# Patient Record
Sex: MALE | Race: WHITE | Employment: FULL TIME | URBAN - METROPOLITAN AREA
[De-identification: names, ages, dates, MRNs, and addresses within clinical notes are randomized per-mention and may not be internally consistent; named-entity substitution may affect disease eponyms.]

---

## 2019-02-22 LAB
ALBUMIN SERPL-MCNC: NORMAL G/DL
ALP BLD-CCNC: NORMAL U/L
ALT SERPL-CCNC: NORMAL U/L
ANION GAP SERPL CALCULATED.3IONS-SCNC: NORMAL MMOL/L
AST SERPL-CCNC: NORMAL U/L
BASOPHILS ABSOLUTE: NORMAL
BASOPHILS RELATIVE PERCENT: NORMAL
BILIRUB SERPL-MCNC: NORMAL MG/DL
BUN BLDV-MCNC: NORMAL MG/DL
CALCIUM SERPL-MCNC: NORMAL MG/DL
CHLORIDE BLD-SCNC: NORMAL MMOL/L
CHOLESTEROL, TOTAL: NORMAL
CHOLESTEROL/HDL RATIO: NORMAL
CO2: NORMAL
CREAT SERPL-MCNC: NORMAL MG/DL
EOSINOPHILS ABSOLUTE: NORMAL
EOSINOPHILS RELATIVE PERCENT: NORMAL
GFR CALCULATED: NORMAL
GLUCOSE BLD-MCNC: NORMAL MG/DL
HCT VFR BLD CALC: NORMAL %
HDLC SERPL-MCNC: NORMAL MG/DL
HEMOGLOBIN: NORMAL
LDL CHOLESTEROL CALCULATED: NORMAL
LYMPHOCYTES ABSOLUTE: NORMAL
LYMPHOCYTES RELATIVE PERCENT: NORMAL
MCH RBC QN AUTO: NORMAL PG
MCHC RBC AUTO-ENTMCNC: NORMAL G/DL
MCV RBC AUTO: NORMAL FL
MONOCYTES ABSOLUTE: NORMAL
MONOCYTES RELATIVE PERCENT: NORMAL
NEUTROPHILS ABSOLUTE: NORMAL
NEUTROPHILS RELATIVE PERCENT: NORMAL
NONHDLC SERPL-MCNC: NORMAL MG/DL
PLATELET # BLD: NORMAL 10*3/UL
PMV BLD AUTO: NORMAL FL
POTASSIUM SERPL-SCNC: NORMAL MMOL/L
RBC # BLD: NORMAL 10*6/UL
SODIUM BLD-SCNC: NORMAL MMOL/L
TOTAL PROTEIN: NORMAL
TRIGL SERPL-MCNC: NORMAL MG/DL
VLDLC SERPL CALC-MCNC: NORMAL MG/DL
WBC # BLD: NORMAL 10*3/UL

## 2019-10-07 ENCOUNTER — OFFICE VISIT (OUTPATIENT)
Dept: PRIMARY CARE CLINIC | Age: 24
End: 2019-10-07

## 2019-10-07 VITALS
BODY MASS INDEX: 28.47 KG/M2 | SYSTOLIC BLOOD PRESSURE: 127 MMHG | WEIGHT: 181.4 LBS | HEART RATE: 55 BPM | HEIGHT: 67 IN | DIASTOLIC BLOOD PRESSURE: 85 MMHG | RESPIRATION RATE: 14 BRPM | TEMPERATURE: 97.7 F | OXYGEN SATURATION: 99 %

## 2019-10-07 DIAGNOSIS — J01.40 ACUTE NON-RECURRENT PANSINUSITIS: Primary | ICD-10-CM

## 2019-10-07 PROCEDURE — 99213 OFFICE O/P EST LOW 20 MIN: CPT | Performed by: NURSE PRACTITIONER

## 2019-10-07 RX ORDER — DOXYCYCLINE HYCLATE 100 MG
100 TABLET ORAL 2 TIMES DAILY
Qty: 20 TABLET | Refills: 0 | Status: SHIPPED | OUTPATIENT
Start: 2019-10-07 | End: 2019-10-17

## 2019-10-07 RX ORDER — EPINEPHRINE 0.3 MG/.3ML
INJECTION SUBCUTANEOUS
COMMUNITY
Start: 2019-08-12 | End: 2021-01-11 | Stop reason: SDUPTHER

## 2019-10-07 RX ORDER — LEVOCETIRIZINE DIHYDROCHLORIDE 5 MG/1
5 TABLET, FILM COATED ORAL NIGHTLY
COMMUNITY

## 2019-10-07 RX ORDER — DULOXETIN HYDROCHLORIDE 30 MG/1
CAPSULE, DELAYED RELEASE ORAL
COMMUNITY
Start: 2019-09-15 | End: 2020-12-03

## 2020-07-31 ENCOUNTER — TELEPHONE (OUTPATIENT)
Dept: PRIMARY CARE CLINIC | Age: 25
End: 2020-07-31

## 2020-07-31 NOTE — TELEPHONE ENCOUNTER
Pt would like a name or a referral  for a psychiatrist ,the patient states his meds has not been working the last couple weeks.

## 2020-07-31 NOTE — TELEPHONE ENCOUNTER
Patient can try [facility] on [address] here in [facility]. Phone #[phone].

## 2020-08-18 RX ORDER — PANTOPRAZOLE SODIUM 40 MG/1
40 TABLET, DELAYED RELEASE ORAL DAILY
Qty: 90 TABLET | Refills: 3 | Status: SHIPPED | OUTPATIENT
Start: 2020-08-18

## 2020-09-24 VITALS
BODY MASS INDEX: 27.28 KG/M2 | HEIGHT: 68 IN | DIASTOLIC BLOOD PRESSURE: 74 MMHG | WEIGHT: 180 LBS | SYSTOLIC BLOOD PRESSURE: 125 MMHG

## 2020-12-03 ENCOUNTER — VIRTUAL VISIT (OUTPATIENT)
Dept: PRIMARY CARE CLINIC | Age: 25
End: 2020-12-03
Payer: COMMERCIAL

## 2020-12-03 PROCEDURE — 99422 OL DIG E/M SVC 11-20 MIN: CPT | Performed by: INTERNAL MEDICINE

## 2020-12-03 RX ORDER — FLUOXETINE HYDROCHLORIDE 20 MG/1
20 CAPSULE ORAL DAILY
Qty: 30 CAPSULE | Refills: 5 | Status: SHIPPED
Start: 2020-12-03 | End: 2021-06-08 | Stop reason: SDUPTHER

## 2020-12-03 ASSESSMENT — PATIENT HEALTH QUESTIONNAIRE - PHQ9
SUM OF ALL RESPONSES TO PHQ QUESTIONS 1-9: 0
SUM OF ALL RESPONSES TO PHQ QUESTIONS 1-9: 0
1. LITTLE INTEREST OR PLEASURE IN DOING THINGS: 0
2. FEELING DOWN, DEPRESSED OR HOPELESS: 0
SUM OF ALL RESPONSES TO PHQ QUESTIONS 1-9: 0
SUM OF ALL RESPONSES TO PHQ9 QUESTIONS 1 & 2: 0

## 2020-12-03 NOTE — PROGRESS NOTES
Papa Corrales  12/3/20     Chief Complaint   Patient presents with    Anxiety      tdap questions         Allergies   Allergen Reactions    Amoxicillin     Azithromycin Diarrhea    Pcn [Penicillins]     Prochlorperazine Edisylate     Succinylcholine Chloride     Keflex [Cephalexin] Rash        Current Outpatient Medications   Medication Sig Dispense Refill    Probiotic Product (PROBIOTIC-10 PO) Take 1 capsule by mouth daily      FLUoxetine (PROZAC) 20 MG capsule Take 1 capsule by mouth daily 30 capsule 5    pantoprazole (PROTONIX) 40 MG tablet Take 1 tablet by mouth daily 90 tablet 3    EPINEPHrine (EPIPEN) 0.3 MG/0.3ML SOAJ injection       levocetirizine (XYZAL) 5 MG tablet Take 5 mg by mouth nightly      mometasone (NASONEX) 50 MCG/ACT nasal spray 2 sprays by Nasal route daily.  acetaminophen (TYLENOL) 325 MG tablet Take 2 tablets by mouth every 4 hours as needed for Pain (For mild pain level 1-3 or for fever > 100.5).  hyoscyamine (LEVBID) 0.375 MG CR tablet Take 0.375 mg by mouth 2 times daily. No current facility-administered medications for this visit. HPI: Telehealth visit. Patient did see a counselor and was diagnosed with anxiety and OCD. It was suggested that he switch from Cymbalta to either fluoxetine or sertraline. Also is concerned about the Tdap vaccination because he had an anaphylactic reaction to it when he was a child. Review of Systems: as per HPI    No physical exam         Assessment:      Obsessive-compulsive disorder, unspecified type  -     FLUoxetine (PROZAC) 20 MG capsule; Take 1 capsule by mouth daily    Chronic anxiety  -     FLUoxetine (PROZAC) 20 MG capsule; Take 1 capsule by mouth daily          Discussion Notes: He will wean off the Cymbalta over a few days and then start fluoxetine 20 mg daily. We will do another telehealth follow-up visit in about 3 weeks.   He will call in the meantime if he has any problems with the

## 2021-01-11 DIAGNOSIS — T78.2XXA ACUTE ANAPHYLAXIS, INITIAL ENCOUNTER: Primary | ICD-10-CM

## 2021-01-11 RX ORDER — EPINEPHRINE 0.3 MG/.3ML
0.3 INJECTION SUBCUTANEOUS PRN
Qty: 2 EACH | Refills: 3 | Status: SHIPPED | OUTPATIENT
Start: 2021-01-11

## 2021-06-08 DIAGNOSIS — F41.9 CHRONIC ANXIETY: ICD-10-CM

## 2021-06-08 DIAGNOSIS — F42.9 OBSESSIVE-COMPULSIVE DISORDER, UNSPECIFIED TYPE: ICD-10-CM

## 2021-06-08 RX ORDER — FLUOXETINE HYDROCHLORIDE 20 MG/1
20 CAPSULE ORAL DAILY
Qty: 30 CAPSULE | Refills: 5 | Status: SHIPPED
Start: 2021-06-08 | End: 2021-12-08 | Stop reason: SDUPTHER

## 2021-12-08 DIAGNOSIS — F41.9 CHRONIC ANXIETY: ICD-10-CM

## 2021-12-08 DIAGNOSIS — F42.9 OBSESSIVE-COMPULSIVE DISORDER, UNSPECIFIED TYPE: ICD-10-CM

## 2021-12-08 RX ORDER — FLUOXETINE HYDROCHLORIDE 20 MG/1
20 CAPSULE ORAL DAILY
Qty: 30 CAPSULE | Refills: 5 | Status: SHIPPED
Start: 2021-12-08 | End: 2022-01-12 | Stop reason: SDUPTHER

## 2022-01-01 ENCOUNTER — HOSPITAL ENCOUNTER (EMERGENCY)
Age: 27
Discharge: HOME | End: 2022-01-01
Attending: EMERGENCY MEDICINE
Payer: COMMERCIAL

## 2022-01-01 VITALS
HEART RATE: 78 BPM | SYSTOLIC BLOOD PRESSURE: 110 MMHG | OXYGEN SATURATION: 98 % | WEIGHT: 185 LBS | DIASTOLIC BLOOD PRESSURE: 68 MMHG | BODY MASS INDEX: 28.04 KG/M2 | TEMPERATURE: 98.3 F | RESPIRATION RATE: 16 BRPM | HEIGHT: 68 IN

## 2022-01-01 DIAGNOSIS — L50.9 URTICARIA: Primary | ICD-10-CM

## 2022-01-01 PROCEDURE — 99284 EMERGENCY DEPT VISIT MOD MDM: CPT

## 2022-01-01 PROCEDURE — 2500000003 HC RX 250 WO HCPCS: Performed by: PHYSICIAN ASSISTANT

## 2022-01-01 PROCEDURE — 6360000002 HC RX W HCPCS: Performed by: EMERGENCY MEDICINE

## 2022-01-01 PROCEDURE — 96375 TX/PRO/DX INJ NEW DRUG ADDON: CPT

## 2022-01-01 PROCEDURE — 96372 THER/PROPH/DIAG INJ SC/IM: CPT

## 2022-01-01 PROCEDURE — 2580000003 HC RX 258: Performed by: PHYSICIAN ASSISTANT

## 2022-01-01 PROCEDURE — 6360000002 HC RX W HCPCS: Performed by: PHYSICIAN ASSISTANT

## 2022-01-01 PROCEDURE — 96374 THER/PROPH/DIAG INJ IV PUSH: CPT

## 2022-01-01 PROCEDURE — 6370000000 HC RX 637 (ALT 250 FOR IP): Performed by: EMERGENCY MEDICINE

## 2022-01-01 RX ORDER — 0.9 % SODIUM CHLORIDE 0.9 %
1000 INTRAVENOUS SOLUTION INTRAVENOUS ONCE
Status: COMPLETED | OUTPATIENT
Start: 2022-01-01 | End: 2022-01-01

## 2022-01-01 RX ORDER — DIPHENHYDRAMINE HYDROCHLORIDE 50 MG/ML
25 INJECTION INTRAMUSCULAR; INTRAVENOUS ONCE
Status: DISCONTINUED | OUTPATIENT
Start: 2022-01-01 | End: 2022-01-01 | Stop reason: HOSPADM

## 2022-01-01 RX ORDER — DEXAMETHASONE SODIUM PHOSPHATE 10 MG/ML
10 INJECTION INTRAMUSCULAR; INTRAVENOUS ONCE
Status: COMPLETED | OUTPATIENT
Start: 2022-01-01 | End: 2022-01-01

## 2022-01-01 RX ORDER — PREDNISONE 10 MG/1
TABLET ORAL
Qty: 20 TABLET | Refills: 0 | Status: SHIPPED | OUTPATIENT
Start: 2022-01-01 | End: 2022-01-11

## 2022-01-01 RX ORDER — HYDROXYZINE PAMOATE 50 MG/1
50 CAPSULE ORAL 3 TIMES DAILY PRN
Qty: 30 CAPSULE | Refills: 0 | Status: SHIPPED | OUTPATIENT
Start: 2022-01-01 | End: 2022-01-11

## 2022-01-01 RX ORDER — EPINEPHRINE 1 MG/ML
0.3 INJECTION, SOLUTION, CONCENTRATE INTRAVENOUS ONCE
Status: COMPLETED | OUTPATIENT
Start: 2022-01-01 | End: 2022-01-01

## 2022-01-01 RX ORDER — IPRATROPIUM BROMIDE AND ALBUTEROL SULFATE 2.5; .5 MG/3ML; MG/3ML
1 SOLUTION RESPIRATORY (INHALATION) ONCE
Status: COMPLETED | OUTPATIENT
Start: 2022-01-01 | End: 2022-01-01

## 2022-01-01 RX ADMIN — EPINEPHRINE 0.3 MG: 1 INJECTION, SOLUTION, CONCENTRATE INTRAVENOUS at 11:36

## 2022-01-01 RX ADMIN — DEXAMETHASONE SODIUM PHOSPHATE 10 MG: 10 INJECTION INTRAMUSCULAR; INTRAVENOUS at 11:12

## 2022-01-01 RX ADMIN — IPRATROPIUM BROMIDE AND ALBUTEROL SULFATE 1 AMPULE: .5; 3 SOLUTION RESPIRATORY (INHALATION) at 13:37

## 2022-01-01 RX ADMIN — SODIUM CHLORIDE 1000 ML: 9 INJECTION, SOLUTION INTRAVENOUS at 11:11

## 2022-01-01 RX ADMIN — FAMOTIDINE 20 MG: 10 INJECTION, SOLUTION INTRAVENOUS at 11:15

## 2022-01-01 NOTE — ED NOTES
Bed:  HALL-  Expected date:   Expected time:   Means of arrival:   Comments:  [address], RN  01/01/22 1057

## 2022-01-01 NOTE — ED TRIAGE NOTES
Pt came to pivot desk and states his head feels flushed and tingling and his g/f states is more red. No change in breathing or swallowing.   99% RA

## 2022-01-03 ENCOUNTER — OFFICE VISIT (OUTPATIENT)
Dept: PRIMARY CARE CLINIC | Age: 27
End: 2022-01-03
Payer: COMMERCIAL

## 2022-01-03 VITALS
HEIGHT: 68 IN | HEART RATE: 70 BPM | RESPIRATION RATE: 18 BRPM | TEMPERATURE: 98.1 F | WEIGHT: 206 LBS | BODY MASS INDEX: 31.22 KG/M2 | OXYGEN SATURATION: 98 %

## 2022-01-03 DIAGNOSIS — T78.40XD SEVERE ALLERGIC REACTION, SUBSEQUENT ENCOUNTER: Primary | ICD-10-CM

## 2022-01-03 DIAGNOSIS — L50.9 HIVES: ICD-10-CM

## 2022-01-03 PROCEDURE — 99213 OFFICE O/P EST LOW 20 MIN: CPT | Performed by: INTERNAL MEDICINE

## 2022-01-03 RX ORDER — FAMOTIDINE 20 MG/1
20 TABLET, FILM COATED ORAL 2 TIMES DAILY
Qty: 60 TABLET | Refills: 0 | Status: SHIPPED | OUTPATIENT
Start: 2022-01-03

## 2022-01-03 RX ORDER — EPINEPHRINE 0.3 MG/.3ML
0.3 INJECTION SUBCUTANEOUS ONCE
Qty: 0.3 ML | Refills: 0 | Status: SHIPPED | OUTPATIENT
Start: 2022-01-03 | End: 2022-01-03

## 2022-01-03 RX ORDER — PREDNISONE 10 MG/1
10 TABLET ORAL DAILY
Qty: 10 TABLET | Refills: 0 | Status: SHIPPED | OUTPATIENT
Start: 2022-01-03 | End: 2022-01-13

## 2022-01-03 SDOH — ECONOMIC STABILITY: FOOD INSECURITY: WITHIN THE PAST 12 MONTHS, THE FOOD YOU BOUGHT JUST DIDN'T LAST AND YOU DIDN'T HAVE MONEY TO GET MORE.: NEVER TRUE

## 2022-01-03 SDOH — ECONOMIC STABILITY: FOOD INSECURITY: WITHIN THE PAST 12 MONTHS, YOU WORRIED THAT YOUR FOOD WOULD RUN OUT BEFORE YOU GOT MONEY TO BUY MORE.: NEVER TRUE

## 2022-01-03 ASSESSMENT — PATIENT HEALTH QUESTIONNAIRE - PHQ9
SUM OF ALL RESPONSES TO PHQ QUESTIONS 1-9: 0
2. FEELING DOWN, DEPRESSED OR HOPELESS: 0
1. LITTLE INTEREST OR PLEASURE IN DOING THINGS: 0
SUM OF ALL RESPONSES TO PHQ9 QUESTIONS 1 & 2: 0
SUM OF ALL RESPONSES TO PHQ QUESTIONS 1-9: 0

## 2022-01-03 ASSESSMENT — SOCIAL DETERMINANTS OF HEALTH (SDOH): HOW HARD IS IT FOR YOU TO PAY FOR THE VERY BASICS LIKE FOOD, HOUSING, MEDICAL CARE, AND HEATING?: NOT HARD AT ALL
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
## 2022-01-06 ENCOUNTER — TELEPHONE (OUTPATIENT)
Dept: PRIMARY CARE CLINIC | Age: 27
End: 2022-01-06

## 2022-01-06 DIAGNOSIS — R53.83 FATIGUE, UNSPECIFIED TYPE: ICD-10-CM

## 2022-01-06 DIAGNOSIS — T78.40XD SEVERE ALLERGIC REACTION, SUBSEQUENT ENCOUNTER: Primary | ICD-10-CM

## 2022-01-06 DIAGNOSIS — L50.9 HIVES: ICD-10-CM

## 2022-01-06 NOTE — TELEPHONE ENCOUNTER
Pt mom phoned stating she wants her son to have bw done complete bw done including ,freet4 t3 tsh. pt still getting hives . Please advise? Implemented All Universal Safety Interventions:  [place] to call system. Call bell, personal items and telephone within reach. Instruct patient to call for assistance. Room bathroom lighting operational. Non-slip footwear when patient is off stretcher. Physically safe environment: no spills, clutter or unnecessary equipment. Stretcher in lowest position, wheels locked, appropriate side rails in place.

## 2022-01-06 NOTE — TELEPHONE ENCOUNTER
Patient's mom notified. She will  order for patient to get done at [facility] in [place].

## 2022-01-10 LAB
ALBUMIN SERPL-MCNC: NORMAL G/DL
ALP BLD-CCNC: NORMAL U/L
ALT SERPL-CCNC: NORMAL U/L
ANION GAP SERPL CALCULATED.3IONS-SCNC: NORMAL MMOL/L
AST SERPL-CCNC: NORMAL U/L
BASOPHILS ABSOLUTE: NORMAL
BASOPHILS RELATIVE PERCENT: NORMAL
BILIRUB SERPL-MCNC: NORMAL MG/DL
BUN BLDV-MCNC: NORMAL MG/DL
CALCIUM SERPL-MCNC: NORMAL MG/DL
CHLORIDE BLD-SCNC: NORMAL MMOL/L
CO2: NORMAL
CREAT SERPL-MCNC: NORMAL MG/DL
EOSINOPHILS ABSOLUTE: NORMAL
EOSINOPHILS RELATIVE PERCENT: NORMAL
GFR CALCULATED: NORMAL
GLUCOSE BLD-MCNC: NORMAL MG/DL
HCT VFR BLD CALC: NORMAL %
HEMOGLOBIN: NORMAL
LYMPHOCYTES ABSOLUTE: NORMAL
LYMPHOCYTES RELATIVE PERCENT: NORMAL
MCH RBC QN AUTO: NORMAL PG
MCHC RBC AUTO-ENTMCNC: NORMAL G/DL
MCV RBC AUTO: NORMAL FL
MONOCYTES ABSOLUTE: NORMAL
MONOCYTES RELATIVE PERCENT: NORMAL
NEUTROPHILS ABSOLUTE: NORMAL
NEUTROPHILS RELATIVE PERCENT: NORMAL
PLATELET # BLD: NORMAL 10*3/UL
PMV BLD AUTO: NORMAL FL
POTASSIUM SERPL-SCNC: NORMAL MMOL/L
RBC # BLD: NORMAL 10*6/UL
SODIUM BLD-SCNC: NORMAL MMOL/L
T3 FREE: NORMAL
T4 FREE: NORMAL
TOTAL PROTEIN: NORMAL
TSH SERPL DL<=0.05 MIU/L-ACNC: NORMAL M[IU]/L
WBC # BLD: NORMAL 10*3/UL

## 2022-01-11 ENCOUNTER — OFFICE VISIT (OUTPATIENT)
Dept: PRIMARY CARE CLINIC | Age: 27
End: 2022-01-11
Payer: COMMERCIAL

## 2022-01-11 VITALS
SYSTOLIC BLOOD PRESSURE: 118 MMHG | WEIGHT: 199 LBS | TEMPERATURE: 97.7 F | OXYGEN SATURATION: 97 % | BODY MASS INDEX: 30.16 KG/M2 | HEART RATE: 77 BPM | DIASTOLIC BLOOD PRESSURE: 70 MMHG | HEIGHT: 68 IN | RESPIRATION RATE: 18 BRPM

## 2022-01-11 DIAGNOSIS — F41.9 CHRONIC ANXIETY: ICD-10-CM

## 2022-01-11 DIAGNOSIS — R53.83 FATIGUE, UNSPECIFIED TYPE: ICD-10-CM

## 2022-01-11 DIAGNOSIS — L50.1 IDIOPATHIC URTICARIA: ICD-10-CM

## 2022-01-11 DIAGNOSIS — F42.9 OBSESSIVE-COMPULSIVE DISORDER, UNSPECIFIED TYPE: ICD-10-CM

## 2022-01-11 DIAGNOSIS — R07.9 CHEST PAIN, UNSPECIFIED TYPE: Primary | ICD-10-CM

## 2022-01-11 DIAGNOSIS — T78.40XD SEVERE ALLERGIC REACTION, SUBSEQUENT ENCOUNTER: ICD-10-CM

## 2022-01-11 DIAGNOSIS — M79.2 THORACIC NEURALGIA: ICD-10-CM

## 2022-01-11 PROCEDURE — 99213 OFFICE O/P EST LOW 20 MIN: CPT | Performed by: INTERNAL MEDICINE

## 2022-01-11 PROCEDURE — 93000 ELECTROCARDIOGRAM COMPLETE: CPT | Performed by: INTERNAL MEDICINE

## 2022-01-12 PROBLEM — L50.1 IDIOPATHIC URTICARIA: Status: ACTIVE | Noted: 2022-01-12

## 2022-01-12 PROBLEM — F41.9 CHRONIC ANXIETY: Status: ACTIVE | Noted: 2022-01-12

## 2022-01-12 PROBLEM — F42.9 OBSESSIVE-COMPULSIVE DISORDER: Status: ACTIVE | Noted: 2022-01-12

## 2022-01-12 RX ORDER — FLUOXETINE HYDROCHLORIDE 20 MG/1
20 CAPSULE ORAL DAILY
Qty: 30 CAPSULE | Refills: 5 | Status: SHIPPED
Start: 2022-01-12 | End: 2022-08-29 | Stop reason: SDUPTHER

## 2022-01-13 NOTE — PROGRESS NOTES
Geoff Schumacher  1/12/22     Chief Complaint   Patient presents with    Chest Pain        Allergies   Allergen Reactions    Bee Venom Anaphylaxis    Amoxicillin     Azithromycin Diarrhea    Pcn [Penicillins]     Prochlorperazine Edisylate     Succinylcholine Chloride     Keflex [Cephalexin] Rash        Current Outpatient Medications   Medication Sig Dispense Refill    FLUoxetine (PROZAC) 20 MG capsule Take 1 capsule by mouth daily 30 capsule 5    famotidine (PEPCID) 20 MG tablet Take 1 tablet by mouth 2 times daily 60 tablet 0    EPINEPHrine (EPIPEN) 0.3 MG/0.3ML SOAJ injection Inject 0.3 mLs into the muscle as needed (allergic reaction) 2 each 3    pantoprazole (PROTONIX) 40 MG tablet Take 1 tablet by mouth daily 90 tablet 3    levocetirizine (XYZAL) 5 MG tablet Take 5 mg by mouth nightly      mometasone (NASONEX) 50 MCG/ACT nasal spray 2 sprays by Nasal route daily.  acetaminophen (TYLENOL) 325 MG tablet Take 2 tablets by mouth every 4 hours as needed for Pain (For mild pain level 1-3 or for fever > 100.5).  hyoscyamine (LEVBID) 0.375 MG CR tablet Take 0.375 mg by mouth 2 times daily.  EPINEPHrine (EPIPEN 2-STANISLAW) 0.3 MG/0.3ML SOAJ injection Inject 0.3 mLs into the muscle once for 1 dose Use as directed for allergic reaction 0.3 mL 0    predniSONE (DELTASONE) 10 MG tablet Take 1 tablet by mouth daily for 10 days 10 tablet 0    Probiotic Product (PROBIOTIC-10 PO) Take 1 capsule by mouth daily       No current facility-administered medications for this visit. HPI: Patient comes in today complaining of pain left upper back radiating around to the left side of his chest.  He was described as somewhat sharp in nature. Not related to exertion. He denied any shortness of breath. It was mainly located along the medial border of the left scapula.   His hives have resolved and he has an appointment coming up with an allergist for more extensive evaluation and testing. Review of Systems: as per HPI      Physical Exam:    Patient is a 31 y.o. male. Patient appears to be in no distress. Breathing comfortably. Ambulates without assistance. HEENT: normal.  Neck supple, no adenopathy or bruits. Thorax symmetrical and nontender. Heart RR, no MGR. Lungs clear. Abd: normal back: There is some tenderness along the medial border of the left scapula. Ext: no edema. Peripheral pulses: normal.  No neurologic deficits noted. Labs done on 1/10/2022, including CMP, CBC, lipid panel, TSH, all normal.    ECG: Sinus rhythm with early repolarization. No acute changes and unchanged since previous EKG done in 2012. Assessment:    Jc Yeboah was seen today for chest pain. Diagnoses and all orders for this visit:    Chest pain, unspecified type, most likely musculoskeletal pain and suspect left thoracic neuralgia  -     EKG 12 lead    Chronic anxiety  -     FLUoxetine (PROZAC) 20 MG capsule; Take 1 capsule by mouth daily    Obsessive-compulsive disorder, unspecified type  -     FLUoxetine (PROZAC) 20 MG capsule; Take 1 capsule by mouth daily    Idiopathic urticaria, further evaluation and consultation with allergist pending. Discussion Notes: Patient reassured and advised to continue his current meds and he may take ibuprofen as needed for pain. He will call in a few days with symptoms are not resolving.   Await further evaluation and recommendations by his allergist.

## 2022-04-06 ENCOUNTER — TELEPHONE (OUTPATIENT)
Dept: PRIMARY CARE CLINIC | Age: 27
End: 2022-04-06

## 2022-04-06 NOTE — TELEPHONE ENCOUNTER
Not sure who he would like to go to or who is on his insurance plan. He can try to make his own appointment to make sure they are on his plan and if they tell him he needs a referral then will be happy to do that. He might be able to check online with his insurance company to see who is on the plan. He might try Dr. Beatriz Cain, who is a dermatologist in [place].

## 2022-04-06 NOTE — TELEPHONE ENCOUNTER
Pt calling he needs a referral to dermatologist for small cysts on his back he has had for awhile but seem to be getting better and irritated    advise

## 2022-04-06 NOTE — TELEPHONE ENCOUNTER
Patient notified. He is going to call [facility] for [facility] appointment. His dermatologist is booking 7 mos out and Dr. Vinod Harrington is no longer there.

## 2022-08-29 DIAGNOSIS — F42.9 OBSESSIVE-COMPULSIVE DISORDER, UNSPECIFIED TYPE: ICD-10-CM

## 2022-08-29 DIAGNOSIS — F41.9 CHRONIC ANXIETY: ICD-10-CM

## 2022-08-29 RX ORDER — FLUOXETINE HYDROCHLORIDE 20 MG/1
20 CAPSULE ORAL DAILY
Qty: 30 CAPSULE | Refills: 5 | Status: SHIPPED | OUTPATIENT
Start: 2022-08-29

## 2023-03-28 DIAGNOSIS — F41.9 CHRONIC ANXIETY: ICD-10-CM

## 2023-03-28 DIAGNOSIS — F42.9 OBSESSIVE-COMPULSIVE DISORDER, UNSPECIFIED TYPE: ICD-10-CM

## 2023-03-28 RX ORDER — FLUOXETINE HYDROCHLORIDE 20 MG/1
20 CAPSULE ORAL DAILY
Qty: 30 CAPSULE | Refills: 5 | Status: SHIPPED | OUTPATIENT
Start: 2023-03-28

## 2023-10-30 DIAGNOSIS — F41.9 CHRONIC ANXIETY: ICD-10-CM

## 2023-10-30 DIAGNOSIS — F42.9 OBSESSIVE-COMPULSIVE DISORDER, UNSPECIFIED TYPE: ICD-10-CM

## 2023-10-30 RX ORDER — FLUOXETINE HYDROCHLORIDE 20 MG/1
20 CAPSULE ORAL DAILY
Qty: 30 CAPSULE | Refills: 5 | Status: SHIPPED | OUTPATIENT
Start: 2023-10-30

## 2024-06-25 DIAGNOSIS — F42.9 OBSESSIVE-COMPULSIVE DISORDER, UNSPECIFIED TYPE: ICD-10-CM

## 2024-06-25 DIAGNOSIS — F41.9 CHRONIC ANXIETY: ICD-10-CM

## 2024-06-25 RX ORDER — FLUOXETINE HYDROCHLORIDE 20 MG/1
20 CAPSULE ORAL DAILY
Qty: 30 CAPSULE | Refills: 0 | Status: SHIPPED | OUTPATIENT
Start: 2024-06-25

## 2024-07-22 ENCOUNTER — OFFICE VISIT (OUTPATIENT)
Dept: PRIMARY CARE CLINIC | Age: 29
End: 2024-07-22
Payer: COMMERCIAL

## 2024-07-22 VITALS
BODY MASS INDEX: 30.01 KG/M2 | SYSTOLIC BLOOD PRESSURE: 100 MMHG | HEIGHT: 68 IN | WEIGHT: 198 LBS | RESPIRATION RATE: 16 BRPM | TEMPERATURE: 97.5 F | HEART RATE: 60 BPM | DIASTOLIC BLOOD PRESSURE: 70 MMHG | OXYGEN SATURATION: 97 %

## 2024-07-22 DIAGNOSIS — F41.9 CHRONIC ANXIETY: ICD-10-CM

## 2024-07-22 DIAGNOSIS — F42.9 OBSESSIVE-COMPULSIVE DISORDER, UNSPECIFIED TYPE: ICD-10-CM

## 2024-07-22 DIAGNOSIS — Z00.00 ROUTINE GENERAL MEDICAL EXAMINATION AT A HEALTH CARE FACILITY: Primary | ICD-10-CM

## 2024-07-22 PROCEDURE — 99395 PREV VISIT EST AGE 18-39: CPT | Performed by: FAMILY MEDICINE

## 2024-07-22 RX ORDER — FLUOXETINE HYDROCHLORIDE 20 MG/1
20 CAPSULE ORAL DAILY
Qty: 90 CAPSULE | Refills: 3 | Status: SHIPPED | OUTPATIENT
Start: 2024-07-22

## 2024-07-22 NOTE — PROGRESS NOTES
7/22/2024     Chief Complaint   Patient presents with    Annual Exam      HPI:  Patient comes in for routine follow up visit. His chronic nasal and sinus congestion have improved since having sinus surgery by ENT specialist. He now uses Nasonex nasal spray and Xyzal 5 mg as needed. Otherwise he feels fairly well. He remains on all of his usual meds and supplements the same as listed on his med list, which was reviewed with him. His GERD has been stable on pantoprazole 40 mg daily. His chronic anxiety has been stable on fluoxetine 20 mg daily.     Review of Systems: as per HPI.    PHYSICAL EXAM:      Vitals:    07/22/24 1432   BP: 100/70   Pulse: 60   Resp: 16   Temp: 97.5 °F (36.4 °C)   SpO2: 97%   Weight: 89.8 kg (198 lb)   Height: 1.727 m (5' 7.99\")      Body mass index is 30.11 kg/m².    GEN:  WDWN male patient seated in chair in NAD.     HEAD:  atraumatic, normocephalic.     EYES:  EOMI, PERRL, conjunctivae appear normal.    ENT: Good hearing, EACs without wax, TM's normal, nasal septum midline, no significant congestion, oral cavity without lesions, good dentition.     NECK: good ROM, no palpable masses, no carotid bruits, no JVD.    LUNGS:  clear to ausc, no rales, rhonchi or wheezes.     HEART:  RRR, no murmurs or gallops.     ABD:  soft, non-tender to palp, no palp masses or HSM, normal BS.     BACK:  no scoliosis or kyphosis, non-tender to palp.    EXTREMITIES: No edema, no ulcerations, varicosities or erythema.  No gross deformities.     MUSCULOSKELETAL: good ROM of all joints, non tender to palp and or with movement.    SKIN: No ulcerations or breakdown, rash, ecchymosis, or other lesions.    NEURO: No tremor, motor UEs 5/5 LEs  5/5, sensory normal, gait normal.     PSYCH: Pleasant and cooperative.  Fluid speech, oriented to person, place, time.     No results found for: \"LABA1C\"  Lab Results   Component Value Date    WBC 5.4 10/17/2016    HGB 14.4 10/17/2016    HCT 43.4 10/17/2016    MCV 89.7 10/17/2016

## 2025-01-06 ENCOUNTER — OFFICE VISIT (OUTPATIENT)
Dept: PRIMARY CARE CLINIC | Age: 30
End: 2025-01-06
Payer: COMMERCIAL

## 2025-01-06 VITALS
HEART RATE: 66 BPM | DIASTOLIC BLOOD PRESSURE: 93 MMHG | HEIGHT: 68 IN | TEMPERATURE: 97.5 F | RESPIRATION RATE: 16 BRPM | WEIGHT: 209 LBS | OXYGEN SATURATION: 96 % | BODY MASS INDEX: 31.67 KG/M2 | SYSTOLIC BLOOD PRESSURE: 161 MMHG

## 2025-01-06 DIAGNOSIS — I10 PRIMARY HYPERTENSION: Primary | ICD-10-CM

## 2025-01-06 DIAGNOSIS — R07.9 CHEST PAIN, UNSPECIFIED TYPE: ICD-10-CM

## 2025-01-06 LAB
ALBUMIN: 4.6 G/DL (ref 3.5–5.2)
ALP BLD-CCNC: 58 U/L (ref 40–129)
ALT SERPL-CCNC: 27 U/L (ref 0–40)
ANION GAP SERPL CALCULATED.3IONS-SCNC: 11 MMOL/L (ref 7–16)
AST SERPL-CCNC: 23 U/L (ref 0–39)
BASOPHILS ABSOLUTE: 0.06 K/UL (ref 0–0.2)
BASOPHILS RELATIVE PERCENT: 1 % (ref 0–2)
BILIRUB SERPL-MCNC: 0.6 MG/DL (ref 0–1.2)
BUN BLDV-MCNC: 18 MG/DL (ref 6–20)
CALCIUM SERPL-MCNC: 9.4 MG/DL (ref 8.6–10.2)
CHLORIDE BLD-SCNC: 100 MMOL/L (ref 98–107)
CO2: 26 MMOL/L (ref 22–29)
CREAT SERPL-MCNC: 1.1 MG/DL (ref 0.7–1.2)
EOSINOPHILS ABSOLUTE: 0.09 K/UL (ref 0.05–0.5)
EOSINOPHILS RELATIVE PERCENT: 1 % (ref 0–6)
GFR, ESTIMATED: >90 ML/MIN/1.73M2
GLUCOSE BLD-MCNC: 94 MG/DL (ref 74–99)
HCT VFR BLD CALC: 47.1 % (ref 37–54)
HEMOGLOBIN: 15.8 G/DL (ref 12.5–16.5)
IMMATURE GRANULOCYTES %: 0 % (ref 0–5)
IMMATURE GRANULOCYTES ABSOLUTE: <0.03 K/UL (ref 0–0.58)
LYMPHOCYTES ABSOLUTE: 2.47 K/UL (ref 1.5–4)
LYMPHOCYTES RELATIVE PERCENT: 34 % (ref 20–42)
MCH RBC QN AUTO: 29.2 PG (ref 26–35)
MCHC RBC AUTO-ENTMCNC: 33.5 G/DL (ref 32–34.5)
MCV RBC AUTO: 86.9 FL (ref 80–99.9)
MONOCYTES ABSOLUTE: 0.62 K/UL (ref 0.1–0.95)
MONOCYTES RELATIVE PERCENT: 9 % (ref 2–12)
NEUTROPHILS ABSOLUTE: 3.96 K/UL (ref 1.8–7.3)
NEUTROPHILS RELATIVE PERCENT: 55 % (ref 43–80)
PDW BLD-RTO: 12.4 % (ref 11.5–15)
PLATELET # BLD: 273 K/UL (ref 130–450)
PMV BLD AUTO: 10.7 FL (ref 7–12)
POTASSIUM SERPL-SCNC: 4.6 MMOL/L (ref 3.5–5)
RBC # BLD: 5.42 M/UL (ref 3.8–5.8)
SODIUM BLD-SCNC: 137 MMOL/L (ref 132–146)
TOTAL PROTEIN: 7.8 G/DL (ref 6.4–8.3)
TSH SERPL DL<=0.05 MIU/L-ACNC: 1.02 UIU/ML (ref 0.27–4.2)
WBC # BLD: 7.2 K/UL (ref 4.5–11.5)

## 2025-01-06 PROCEDURE — M1308 PR FLU IMMUNIZE NO ADMIN: HCPCS | Performed by: FAMILY MEDICINE

## 2025-01-06 PROCEDURE — G8417 CALC BMI ABV UP PARAM F/U: HCPCS | Performed by: FAMILY MEDICINE

## 2025-01-06 PROCEDURE — 3080F DIAST BP >= 90 MM HG: CPT | Performed by: FAMILY MEDICINE

## 2025-01-06 PROCEDURE — 93000 ELECTROCARDIOGRAM COMPLETE: CPT | Performed by: FAMILY MEDICINE

## 2025-01-06 PROCEDURE — 3077F SYST BP >= 140 MM HG: CPT | Performed by: FAMILY MEDICINE

## 2025-01-06 PROCEDURE — 1036F TOBACCO NON-USER: CPT | Performed by: FAMILY MEDICINE

## 2025-01-06 PROCEDURE — G8427 DOCREV CUR MEDS BY ELIG CLIN: HCPCS | Performed by: FAMILY MEDICINE

## 2025-01-06 PROCEDURE — 99214 OFFICE O/P EST MOD 30 MIN: CPT | Performed by: FAMILY MEDICINE

## 2025-01-06 RX ORDER — PANTOPRAZOLE SODIUM 20 MG/1
20 TABLET, DELAYED RELEASE ORAL DAILY
COMMUNITY
Start: 2024-11-18

## 2025-01-06 NOTE — PROGRESS NOTES
mL 0     No current facility-administered medications for this visit.      An  electronic signature was used to authenticate this note.    --Conor Garcia MD on 1/6/2025 at 4:11 PM

## 2025-01-07 DIAGNOSIS — I10 PRIMARY HYPERTENSION: Primary | ICD-10-CM

## 2025-01-07 LAB
CHOLESTEROL, TOTAL: 249 MG/DL
HDLC SERPL-MCNC: 56 MG/DL
LDL CHOLESTEROL: 167 MG/DL
TRIGL SERPL-MCNC: 131 MG/DL
VLDLC SERPL CALC-MCNC: 26 MG/DL

## 2025-01-07 RX ORDER — LOSARTAN POTASSIUM AND HYDROCHLOROTHIAZIDE 12.5; 5 MG/1; MG/1
1 TABLET ORAL DAILY
Qty: 31 EACH | Refills: 1 | Status: SHIPPED | OUTPATIENT
Start: 2025-01-07
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
## 2025-01-27 ENCOUNTER — TELEPHONE (OUTPATIENT)
Dept: CARDIOLOGY | Age: 30
End: 2025-01-27

## 2025-01-27 ENCOUNTER — OFFICE VISIT (OUTPATIENT)
Dept: PRIMARY CARE CLINIC | Age: 30
End: 2025-01-27
Payer: COMMERCIAL

## 2025-01-27 VITALS
RESPIRATION RATE: 18 BRPM | DIASTOLIC BLOOD PRESSURE: 60 MMHG | BODY MASS INDEX: 31.67 KG/M2 | TEMPERATURE: 97 F | HEART RATE: 68 BPM | WEIGHT: 209 LBS | SYSTOLIC BLOOD PRESSURE: 100 MMHG | OXYGEN SATURATION: 97 % | HEIGHT: 68 IN

## 2025-01-27 DIAGNOSIS — E78.00 PURE HYPERCHOLESTEROLEMIA: ICD-10-CM

## 2025-01-27 DIAGNOSIS — R07.9 CHEST PAIN, UNSPECIFIED TYPE: ICD-10-CM

## 2025-01-27 DIAGNOSIS — F41.9 CHRONIC ANXIETY: ICD-10-CM

## 2025-01-27 DIAGNOSIS — I10 PRIMARY HYPERTENSION: Primary | ICD-10-CM

## 2025-01-27 PROCEDURE — G8417 CALC BMI ABV UP PARAM F/U: HCPCS | Performed by: FAMILY MEDICINE

## 2025-01-27 PROCEDURE — 1036F TOBACCO NON-USER: CPT | Performed by: FAMILY MEDICINE

## 2025-01-27 PROCEDURE — 99214 OFFICE O/P EST MOD 30 MIN: CPT | Performed by: FAMILY MEDICINE

## 2025-01-27 PROCEDURE — 3078F DIAST BP <80 MM HG: CPT | Performed by: FAMILY MEDICINE

## 2025-01-27 PROCEDURE — G8427 DOCREV CUR MEDS BY ELIG CLIN: HCPCS | Performed by: FAMILY MEDICINE

## 2025-01-27 PROCEDURE — 3074F SYST BP LT 130 MM HG: CPT | Performed by: FAMILY MEDICINE

## 2025-01-27 SDOH — ECONOMIC STABILITY: FOOD INSECURITY: WITHIN THE PAST 12 MONTHS, THE FOOD YOU BOUGHT JUST DIDN'T LAST AND YOU DIDN'T HAVE MONEY TO GET MORE.: NEVER TRUE

## 2025-01-27 SDOH — ECONOMIC STABILITY: TRANSPORTATION INSECURITY
IN THE PAST 12 MONTHS, HAS LACK OF TRANSPORTATION KEPT YOU FROM MEETINGS, WORK, OR FROM GETTING THINGS NEEDED FOR DAILY LIVING?: NO

## 2025-01-27 SDOH — ECONOMIC STABILITY: FOOD INSECURITY: WITHIN THE PAST 12 MONTHS, YOU WORRIED THAT YOUR FOOD WOULD RUN OUT BEFORE YOU GOT MONEY TO BUY MORE.: NEVER TRUE

## 2025-01-27 SDOH — ECONOMIC STABILITY: TRANSPORTATION INSECURITY
IN THE PAST 12 MONTHS, HAS THE LACK OF TRANSPORTATION KEPT YOU FROM MEDICAL APPOINTMENTS OR FROM GETTING MEDICATIONS?: NO

## 2025-01-27 SDOH — ECONOMIC STABILITY: INCOME INSECURITY: IN THE LAST 12 MONTHS, WAS THERE A TIME WHEN YOU WERE NOT ABLE TO PAY THE MORTGAGE OR RENT ON TIME?: NO

## 2025-01-27 ASSESSMENT — PATIENT HEALTH QUESTIONNAIRE - PHQ9
SUM OF ALL RESPONSES TO PHQ QUESTIONS 1-9: 1
SUM OF ALL RESPONSES TO PHQ9 QUESTIONS 1 & 2: 1
2. FEELING DOWN, DEPRESSED OR HOPELESS: NOT AT ALL
SUM OF ALL RESPONSES TO PHQ QUESTIONS 1-9: 1
SUM OF ALL RESPONSES TO PHQ QUESTIONS 1-9: 1
1. LITTLE INTEREST OR PLEASURE IN DOING THINGS: SEVERAL DAYS
SUM OF ALL RESPONSES TO PHQ QUESTIONS 1-9: 1
SUM OF ALL RESPONSES TO PHQ9 QUESTIONS 1 & 2: 1
1. LITTLE INTEREST OR PLEASURE IN DOING THINGS: SEVERAL DAYS
2. FEELING DOWN, DEPRESSED OR HOPELESS: NOT AT ALL

## 2025-02-05 ENCOUNTER — TELEPHONE (OUTPATIENT)
Dept: CARDIOLOGY | Age: 30
End: 2025-02-05

## 2025-02-05 NOTE — TELEPHONE ENCOUNTER
Spoke with patient and confirmed regular stress test appointment on February 7, 2025 at 0715. Instructions for test,medications, and COVID-19 preprocedure information reviewed with patient, questions answered. Patient verbalized understanding. Also instructed to call office if unable to keep appointment.

## 2025-02-07 ENCOUNTER — HOSPITAL ENCOUNTER (OUTPATIENT)
Dept: CARDIOLOGY | Age: 30
Discharge: HOME | End: 2025-02-09
Payer: COMMERCIAL

## 2025-02-07 VITALS
SYSTOLIC BLOOD PRESSURE: 120 MMHG | RESPIRATION RATE: 16 BRPM | DIASTOLIC BLOOD PRESSURE: 80 MMHG | HEART RATE: 56 BPM | WEIGHT: 200 LBS | BODY MASS INDEX: 29.62 KG/M2 | HEIGHT: 69 IN

## 2025-02-07 DIAGNOSIS — R07.9 CHEST PAIN, UNSPECIFIED TYPE: ICD-10-CM

## 2025-02-07 LAB
ECHO BSA: 2.1 M2
STRESS ANGINA INDEX: 0
STRESS BASELINE DIAS BP: 80 MMHG
STRESS BASELINE HR: 60 BPM
STRESS BASELINE SYS BP: 120 MMHG
STRESS ESTIMATED WORKLOAD: 17.1 METS
STRESS EXERCISE DUR MIN: 13 MIN
STRESS EXERCISE DUR SEC: 1 SEC
STRESS PEAK DIAS BP: 70 MMHG
STRESS PEAK SYS BP: 176 MMHG
STRESS PERCENT HR ACHIEVED: 89 %
STRESS POST PEAK HR: 169 BPM
STRESS RATE PRESSURE PRODUCT: NORMAL BPM*MMHG
STRESS SR DUKE TREADMILL SCORE: 13
STRESS ST DEPRESSION: 0 MM
STRESS TARGET HR: 189 BPM

## 2025-02-07 PROCEDURE — 93016 CV STRESS TEST SUPVJ ONLY: CPT | Performed by: INTERNAL MEDICINE

## 2025-02-07 PROCEDURE — 93017 CV STRESS TEST TRACING ONLY: CPT

## 2025-02-07 PROCEDURE — 93018 CV STRESS TEST I&R ONLY: CPT | Performed by: INTERNAL MEDICINE

## 2025-02-07 RX ORDER — METHYLPREDNISOLONE 4 MG/1
4 TABLET ORAL SEE ADMIN INSTRUCTIONS
COMMUNITY

## 2025-02-07 RX ORDER — CEFDINIR 300 MG/1
CAPSULE ORAL
COMMUNITY
Start: 2025-02-05

## 2025-03-11 DIAGNOSIS — I10 PRIMARY HYPERTENSION: ICD-10-CM

## 2025-03-11 RX ORDER — LOSARTAN POTASSIUM AND HYDROCHLOROTHIAZIDE 12.5; 5 MG/1; MG/1
1 TABLET ORAL DAILY
Qty: 31 TABLET | Refills: 3 | Status: SHIPPED | OUTPATIENT
Start: 2025-03-11

## 2025-03-11 NOTE — TELEPHONE ENCOUNTER
Name of Medication(s) Requested:  Requested Prescriptions     Pending Prescriptions Disp Refills    losartan-hydroCHLOROthiazide (HYZAAR) 50-12.5 MG per tablet [Pharmacy Med Name: LOSARTAN POTASSIUM/HYDROCHLOROTHIAZIDE 50-12.5 TABLET] 31 tablet 3     Sig: TAKE ONE (1) TABLET BY MOUTH DAILY       Medication is on current medication list Yes    Dosage and directions were verified? Yes    Quantity verified: 30 day supply     Pharmacy Verified?  Yes    Last Appointment:  1/27/2025    Future appts:  Future Appointments   Date Time Provider Department Center   7/24/2025  3:30 PM Conro Garcia MD TRAIL [facility] DE[facility]        (If no appt send self scheduling link. .REFILLAPPT)  Scheduling request sent?     [] Yes  [] No    Does patient need updated?  [] Yes  [] No

## 2025-07-19 DIAGNOSIS — I10 PRIMARY HYPERTENSION: ICD-10-CM

## 2025-07-21 RX ORDER — LOSARTAN POTASSIUM AND HYDROCHLOROTHIAZIDE 12.5; 5 MG/1; MG/1
1 TABLET ORAL DAILY
Qty: 31 TABLET | Refills: 3 | Status: SHIPPED | OUTPATIENT
Start: 2025-07-21

## 2025-07-21 NOTE — TELEPHONE ENCOUNTER
Name of Medication(s) Requested:  Requested Prescriptions     Pending Prescriptions Disp Refills    losartan-hydroCHLOROthiazide (HYZAAR) 50-12.5 MG per tablet [Pharmacy Med Name: LOSARTAN POTASSIUM/HYDROCHLOROTHIAZIDE 50-12.5 TABLET] 31 tablet 3     Sig: TAKE ONE (1) TABLET BY MOUTH DAILY       Medication is on current medication list Yes    Dosage and directions were verified? Yes    Quantity verified: 30 day supply     Pharmacy Verified?  Yes    Last Appointment:  1/27/2025    Future appts:  Future Appointments   Date Time Provider Department Center   7/24/2025  3:30 PM Conor Garcia MD TRAIL [facility] DE[facility]        (If no appt send self scheduling link. .REFILLAPPT)  Scheduling request sent?     [] Yes  [] No    Does patient need updated?  [] Yes  [] No

## 2025-07-23 RX ORDER — OMALIZUMAB 300 MG/2ML
300 INJECTION, SOLUTION SUBCUTANEOUS
COMMUNITY
Start: 2025-07-17

## 2025-07-24 ENCOUNTER — OFFICE VISIT (OUTPATIENT)
Dept: PRIMARY CARE CLINIC | Age: 30
End: 2025-07-24
Payer: COMMERCIAL

## 2025-07-24 VITALS
OXYGEN SATURATION: 97 % | RESPIRATION RATE: 16 BRPM | TEMPERATURE: 98.4 F | WEIGHT: 193 LBS | HEART RATE: 68 BPM | BODY MASS INDEX: 28.58 KG/M2 | SYSTOLIC BLOOD PRESSURE: 119 MMHG | DIASTOLIC BLOOD PRESSURE: 73 MMHG | HEIGHT: 69 IN

## 2025-07-24 DIAGNOSIS — F41.9 CHRONIC ANXIETY: ICD-10-CM

## 2025-07-24 DIAGNOSIS — F42.9 OBSESSIVE-COMPULSIVE DISORDER, UNSPECIFIED TYPE: ICD-10-CM

## 2025-07-24 DIAGNOSIS — E78.00 PURE HYPERCHOLESTEROLEMIA: ICD-10-CM

## 2025-07-24 DIAGNOSIS — I10 PRIMARY HYPERTENSION: Primary | ICD-10-CM

## 2025-07-24 LAB
ALBUMIN: 4.6 G/DL (ref 3.5–5.2)
ALP BLD-CCNC: 51 U/L (ref 40–129)
ALT SERPL-CCNC: 42 U/L (ref 0–50)
ANION GAP SERPL CALCULATED.3IONS-SCNC: 12 MMOL/L (ref 7–16)
AST SERPL-CCNC: 65 U/L (ref 0–50)
BASOPHILS ABSOLUTE: 0.06 K/UL (ref 0–0.2)
BASOPHILS RELATIVE PERCENT: 1 % (ref 0–2)
BILIRUB SERPL-MCNC: 0.8 MG/DL (ref 0–1.2)
BUN BLDV-MCNC: 19 MG/DL (ref 6–20)
CALCIUM SERPL-MCNC: 10 MG/DL (ref 8.6–10)
CHLORIDE BLD-SCNC: 98 MMOL/L (ref 98–107)
CHOLESTEROL, TOTAL: 207 MG/DL
CO2: 27 MMOL/L (ref 22–29)
CREAT SERPL-MCNC: 1.2 MG/DL (ref 0.7–1.2)
EOSINOPHILS ABSOLUTE: 0.08 K/UL (ref 0.05–0.5)
EOSINOPHILS RELATIVE PERCENT: 1 % (ref 0–6)
GFR, ESTIMATED: 84 ML/MIN/1.73M2
GLUCOSE BLD-MCNC: 105 MG/DL (ref 74–99)
HCT VFR BLD CALC: 45.7 % (ref 37–54)
HDLC SERPL-MCNC: 62 MG/DL
HEMOGLOBIN: 14.6 G/DL (ref 12.5–16.5)
IMMATURE GRANULOCYTES %: 0 % (ref 0–5)
IMMATURE GRANULOCYTES ABSOLUTE: 0.03 K/UL (ref 0–0.58)
LDL CHOLESTEROL: 132 MG/DL
LYMPHOCYTES ABSOLUTE: 2.63 K/UL (ref 1.5–4)
LYMPHOCYTES RELATIVE PERCENT: 26 % (ref 20–42)
MCH RBC QN AUTO: 29 PG (ref 26–35)
MCHC RBC AUTO-ENTMCNC: 31.9 G/DL (ref 32–34.5)
MCV RBC AUTO: 90.9 FL (ref 80–99.9)
MONOCYTES ABSOLUTE: 0.69 K/UL (ref 0.1–0.95)
MONOCYTES RELATIVE PERCENT: 7 % (ref 2–12)
NEUTROPHILS ABSOLUTE: 6.62 K/UL (ref 1.8–7.3)
NEUTROPHILS RELATIVE PERCENT: 66 % (ref 43–80)
PDW BLD-RTO: 13.3 % (ref 11.5–15)
PLATELET # BLD: 183 K/UL (ref 130–450)
PMV BLD AUTO: 11.3 FL (ref 7–12)
POTASSIUM SERPL-SCNC: 4.1 MMOL/L (ref 3.5–5.1)
RBC # BLD: 5.03 M/UL (ref 3.8–5.8)
SODIUM BLD-SCNC: 138 MMOL/L (ref 136–145)
TOTAL PROTEIN: 7.5 G/DL (ref 6.4–8.3)
TRIGL SERPL-MCNC: 61 MG/DL
TSH SERPL DL<=0.05 MIU/L-ACNC: 0.67 UIU/ML (ref 0.27–4.2)
VLDLC SERPL CALC-MCNC: 12 MG/DL
WBC # BLD: 10.1 K/UL (ref 4.5–11.5)

## 2025-07-24 PROCEDURE — 3078F DIAST BP <80 MM HG: CPT | Performed by: FAMILY MEDICINE

## 2025-07-24 PROCEDURE — 3074F SYST BP LT 130 MM HG: CPT | Performed by: FAMILY MEDICINE

## 2025-07-24 PROCEDURE — 36415 COLL VENOUS BLD VENIPUNCTURE: CPT | Performed by: FAMILY MEDICINE

## 2025-07-24 PROCEDURE — 99213 OFFICE O/P EST LOW 20 MIN: CPT | Performed by: FAMILY MEDICINE
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
## 2025-08-15 ENCOUNTER — PATIENT MESSAGE (OUTPATIENT)
Dept: PRIMARY CARE CLINIC | Age: 30
End: 2025-08-15

## 2025-08-15 DIAGNOSIS — E78.00 PURE HYPERCHOLESTEROLEMIA: Primary | ICD-10-CM

## 2025-08-25 DIAGNOSIS — F42.9 OBSESSIVE-COMPULSIVE DISORDER, UNSPECIFIED TYPE: ICD-10-CM

## 2025-08-25 DIAGNOSIS — F41.9 CHRONIC ANXIETY: ICD-10-CM
